# Patient Record
Sex: FEMALE | URBAN - NONMETROPOLITAN AREA
[De-identification: names, ages, dates, MRNs, and addresses within clinical notes are randomized per-mention and may not be internally consistent; named-entity substitution may affect disease eponyms.]

---

## 2024-05-07 ENCOUNTER — TELEPHONE (OUTPATIENT)
Dept: AUDIOLOGY | Age: 59
End: 2024-05-07

## 2024-05-07 NOTE — TELEPHONE ENCOUNTER
Called [employer] regarding hearing aid benefits. Patient does have benefits for hearing aids and we are in network. CM- please call to schedule HAE. Thanks!

## 2024-05-09 NOTE — TELEPHONE ENCOUNTER
I spoke to  at [facility] that initiated the process of getting hearing aids.  She will have the patient call here to schedule a HAE.